# Patient Record
Sex: MALE | Race: WHITE | NOT HISPANIC OR LATINO | Employment: FULL TIME | URBAN - NONMETROPOLITAN AREA
[De-identification: names, ages, dates, MRNs, and addresses within clinical notes are randomized per-mention and may not be internally consistent; named-entity substitution may affect disease eponyms.]

---

## 2019-12-04 ENCOUNTER — OFFICE VISIT (OUTPATIENT)
Dept: INTERNAL MEDICINE | Facility: CLINIC | Age: 44
End: 2019-12-04

## 2019-12-04 VITALS
HEIGHT: 68 IN | SYSTOLIC BLOOD PRESSURE: 130 MMHG | TEMPERATURE: 97.9 F | DIASTOLIC BLOOD PRESSURE: 86 MMHG | WEIGHT: 184 LBS | OXYGEN SATURATION: 97 % | RESPIRATION RATE: 22 BRPM | BODY MASS INDEX: 27.89 KG/M2 | HEART RATE: 86 BPM

## 2019-12-04 DIAGNOSIS — K40.20 NON-RECURRENT BILATERAL INGUINAL HERNIA WITHOUT OBSTRUCTION OR GANGRENE: Primary | ICD-10-CM

## 2019-12-04 PROCEDURE — 99203 OFFICE O/P NEW LOW 30 MIN: CPT | Performed by: FAMILY MEDICINE

## 2019-12-04 NOTE — PROGRESS NOTES
Subjective     Chief Complaint   Patient presents with   • Groin Swelling     left side, painful, and causing abdominal pain with it, no urination pain or bleeding       History of Present Illness  Patient presents with complaints of left groin testicular swelling.  He notes that this occurred several weeks ago initially.  It was quite uncomfortable.  It did ultimately go away.  Since that time he has had little discomfort but over the last few days he has had the swelling again reappeared and it was very painful.  He had no difficulty with urination.  He had no difficulty with having bowel movements.  He noted no bleeding.  He has a history of previous hernia repair but it is in his abdomen and mesh was used according to him it was back in the 90s.  Currently there is no real swelling.    Patient's PMR from outside medical facility reviewed and noted.    Review of Systems     Otherwise complete ROS reviewed and negative except as mentioned in the HPI.    Past Medical History: History reviewed. No pertinent past medical history.  Past Surgical History:  Past Surgical History:   Procedure Laterality Date   • HERNIA REPAIR  1996   • SHOULDER ACROMIOCLAVICULAR JOINT REPAIR Right 2016     Social History:  reports that he has been smoking electronic cigarette.  He has never used smokeless tobacco. He reports that he drinks about 0.6 - 1.2 oz of alcohol per week. He reports that he does not use drugs.    Family History: family history includes Arthritis in his paternal grandfather; Cancer in his maternal grandfather and paternal grandfather; Diabetes in his maternal grandfather and paternal grandfather; Heart disease in his paternal grandfather.       Allergies:  No Known Allergies  Medications:  Prior to Admission medications    Not on File       Objective     Vital Signs: /86 (BP Location: Left arm, Patient Position: Sitting, Cuff Size: Adult)   Pulse 86   Temp 97.9 °F (36.6 °C) (Oral)   Resp 22   Ht  "171.5 cm (67.5\")   Wt 83.5 kg (184 lb)   SpO2 97%   BMI 28.39 kg/m²   Physical Exam   Constitutional: He is oriented to person, place, and time. He appears well-developed and well-nourished.   HENT:   Head: Normocephalic and atraumatic.   Abdominal: Soft. Bowel sounds are normal. He exhibits no distension and no mass. There is no tenderness.   Genitourinary: Penis normal.   Genitourinary Comments: The penis is circumcised.  Normal in appearance.  The testes and testicular sac are examined.  The testes are of an equal size.  The tubular structures are nontender.  The testes themselves are not nodular.  Patient is stood for an exam of the inguinal canal.  The exam of the left canal reveals  marked tapping against the finger with bearing down and cough.  Evaluation of the right inguinal canal reveals a soft tap with cough.   Musculoskeletal: Normal range of motion. He exhibits no edema or deformity.   Neurological: He is alert and oriented to person, place, and time.   Skin: Skin is warm and dry.   Psychiatric: He has a normal mood and affect. His behavior is normal.   Nursing note and vitals reviewed.      Patient's Body mass index is 28.39 kg/m². BMI is within normal parameters. No follow-up required..      Results Reviewed:  No results found for: GLUCOSE, BUN, CREATININE, NA, K, CL, CO2, CALCIUM, ALT, AST, WBC, HCT, PLT, CHOL, TRIG, HDL, LDL, LDLHDL, HGBA1C      Assessment / Plan     Assessment/Plan:  1. Non-recurrent bilateral inguinal hernia without obstruction or gangrene    - Ambulatory Referral to General Surgery  Discussed with Dr Indigo Robledo and she will see on the 6th at 1400 hours      Return if symptoms worsen or fail to improve. unless patient needs to be seen sooner or acute issues arise.        I have discussed the patient results/orders and and plan/recommendation with them at today's visit.      Kori Field DO   12/04/2019        "

## 2019-12-07 ENCOUNTER — TELEPHONE (OUTPATIENT)
Dept: INTERNAL MEDICINE | Facility: CLINIC | Age: 44
End: 2019-12-07

## 2019-12-09 ENCOUNTER — TRANSCRIBE ORDERS (OUTPATIENT)
Dept: ADMINISTRATIVE | Facility: HOSPITAL | Age: 44
End: 2019-12-09

## 2019-12-09 DIAGNOSIS — K40.20 HERNIA OF TESTICLES: Primary | ICD-10-CM

## 2019-12-12 ENCOUNTER — HOSPITAL ENCOUNTER (OUTPATIENT)
Dept: ULTRASOUND IMAGING | Facility: HOSPITAL | Age: 44
Discharge: HOME | End: 2019-12-12
Admitting: SPECIALIST

## 2019-12-12 DIAGNOSIS — K40.20 HERNIA OF TESTICLES: ICD-10-CM

## 2019-12-12 PROCEDURE — 76870 US EXAM SCROTUM: CPT

## 2022-07-13 ENCOUNTER — HOSPITAL ENCOUNTER (OUTPATIENT)
Dept: RADIOLOGY | Facility: HOSPITAL | Age: 47
Discharge: HOME | End: 2022-07-13
Attending: PHYSICIAN ASSISTANT
Payer: COMMERCIAL

## 2022-07-13 ENCOUNTER — OFFICE VISIT (OUTPATIENT)
Dept: ORTHOPEDICS | Facility: CLINIC | Age: 47
End: 2022-07-13
Payer: COMMERCIAL

## 2022-07-13 VITALS
DIASTOLIC BLOOD PRESSURE: 85 MMHG | SYSTOLIC BLOOD PRESSURE: 135 MMHG | BODY MASS INDEX: 29.43 KG/M2 | WEIGHT: 187.5 LBS | HEIGHT: 67 IN | HEART RATE: 72 BPM

## 2022-07-13 DIAGNOSIS — M25.312 INSTABILITY OF LEFT SHOULDER JOINT: Primary | ICD-10-CM

## 2022-07-13 DIAGNOSIS — G89.29 CHRONIC LEFT SHOULDER PAIN: ICD-10-CM

## 2022-07-13 DIAGNOSIS — M25.512 CHRONIC LEFT SHOULDER PAIN: ICD-10-CM

## 2022-07-13 DIAGNOSIS — M54.2 CERVICAL PAIN: ICD-10-CM

## 2022-07-13 DIAGNOSIS — M25.511 RIGHT SHOULDER PAIN, UNSPECIFIED CHRONICITY: ICD-10-CM

## 2022-07-13 PROCEDURE — 1160F PR REVIEW ALL MEDS BY PRESCRIBER/CLIN PHARMACIST DOCUMENTED: ICD-10-PCS | Mod: CPTII,S$GLB,, | Performed by: PHYSICIAN ASSISTANT

## 2022-07-13 PROCEDURE — 3008F BODY MASS INDEX DOCD: CPT | Mod: CPTII,S$GLB,, | Performed by: PHYSICIAN ASSISTANT

## 2022-07-13 PROCEDURE — 3079F DIAST BP 80-89 MM HG: CPT | Mod: CPTII,S$GLB,, | Performed by: PHYSICIAN ASSISTANT

## 2022-07-13 PROCEDURE — 99203 OFFICE O/P NEW LOW 30 MIN: CPT | Mod: S$GLB,,, | Performed by: PHYSICIAN ASSISTANT

## 2022-07-13 PROCEDURE — 73030 X-RAY EXAM OF SHOULDER: CPT | Mod: 26,LT,, | Performed by: RADIOLOGY

## 2022-07-13 PROCEDURE — 73030 XR SHOULDER COMPLETE 2 OR MORE VIEWS LEFT: ICD-10-PCS | Mod: 26,LT,, | Performed by: RADIOLOGY

## 2022-07-13 PROCEDURE — 3079F PR MOST RECENT DIASTOLIC BLOOD PRESSURE 80-89 MM HG: ICD-10-PCS | Mod: CPTII,S$GLB,, | Performed by: PHYSICIAN ASSISTANT

## 2022-07-13 PROCEDURE — 1159F PR MEDICATION LIST DOCUMENTED IN MEDICAL RECORD: ICD-10-PCS | Mod: CPTII,S$GLB,, | Performed by: PHYSICIAN ASSISTANT

## 2022-07-13 PROCEDURE — 72050 X-RAY EXAM NECK SPINE 4/5VWS: CPT | Mod: TC

## 2022-07-13 PROCEDURE — 3008F PR BODY MASS INDEX (BMI) DOCUMENTED: ICD-10-PCS | Mod: CPTII,S$GLB,, | Performed by: PHYSICIAN ASSISTANT

## 2022-07-13 PROCEDURE — 99203 PR OFFICE/OUTPT VISIT, NEW, LEVL III, 30-44 MIN: ICD-10-PCS | Mod: S$GLB,,, | Performed by: PHYSICIAN ASSISTANT

## 2022-07-13 PROCEDURE — 1160F RVW MEDS BY RX/DR IN RCRD: CPT | Mod: CPTII,S$GLB,, | Performed by: PHYSICIAN ASSISTANT

## 2022-07-13 PROCEDURE — 99999 PR PBB SHADOW E&M-NEW PATIENT-LVL IV: CPT | Mod: PBBFAC,,, | Performed by: PHYSICIAN ASSISTANT

## 2022-07-13 PROCEDURE — 1159F MED LIST DOCD IN RCRD: CPT | Mod: CPTII,S$GLB,, | Performed by: PHYSICIAN ASSISTANT

## 2022-07-13 PROCEDURE — 3075F SYST BP GE 130 - 139MM HG: CPT | Mod: CPTII,S$GLB,, | Performed by: PHYSICIAN ASSISTANT

## 2022-07-13 PROCEDURE — 3075F PR MOST RECENT SYSTOLIC BLOOD PRESS GE 130-139MM HG: ICD-10-PCS | Mod: CPTII,S$GLB,, | Performed by: PHYSICIAN ASSISTANT

## 2022-07-13 PROCEDURE — 99999 PR PBB SHADOW E&M-NEW PATIENT-LVL IV: ICD-10-PCS | Mod: PBBFAC,,, | Performed by: PHYSICIAN ASSISTANT

## 2022-07-13 PROCEDURE — 72050 XR CERVICAL SPINE AP LAT WITH FLEX EXTEN: ICD-10-PCS | Mod: 26,,, | Performed by: RADIOLOGY

## 2022-07-13 PROCEDURE — 72050 X-RAY EXAM NECK SPINE 4/5VWS: CPT | Mod: 26,,, | Performed by: RADIOLOGY

## 2022-07-13 PROCEDURE — 73030 X-RAY EXAM OF SHOULDER: CPT | Mod: TC,LT

## 2022-07-13 RX ORDER — MELOXICAM 15 MG/1
15 TABLET ORAL DAILY
Qty: 30 TABLET | Refills: 1 | Status: SHIPPED | OUTPATIENT
Start: 2022-07-13 | End: 2022-08-12

## 2022-07-13 RX ORDER — MELOXICAM 15 MG/1
15 TABLET ORAL DAILY
Qty: 30 TABLET | Refills: 1 | Status: SHIPPED | OUTPATIENT
Start: 2022-07-13 | End: 2022-07-13

## 2022-07-13 NOTE — PROGRESS NOTES
SUBJECTIVE:     Chief Complaint & History of Present Illness:  Cruz Thomas Jr. is a  New  patient 47 y.o. male who is seen here today with a complaint of    Chief Complaint   Patient presents with    Left Shoulder - Pain    Neck - Pain    .  Patient is here today for evaluation treatment of left shoulder and to a lesser degree neck pain for the past several months.  It has had off and on problems with both shoulders and states he has actually had right shoulder rotator cuff issues dating back several years.  He works as a heavy  and does significant amounts of heavy lifting and caring on a daily basis.  Although he is not remember a specific trauma or injury to the shoulder he has had increasing difficulty with activities at shoulder height or greater with pain radiating up towards the trapezius muscle in the neck.  Has taken over-the-counter and prescription NSAIDs with little relief he has not noticed any loss in range of motion but has felt he has some decreased strength particularly with movements away from the core  On a scale of 1-10, with 10 being worst pain imaginable, he rates this pain as 2 on good days and 8 on bad days.  he describes the pain as sore and aching.    Review of patient's allergies indicates:  No Known Allergies      Current Outpatient Medications   Medication Sig Dispense Refill    meloxicam (MOBIC) 15 MG tablet Take 1 tablet (15 mg total) by mouth once daily. 30 tablet 1     No current facility-administered medications for this visit.       Past Medical History:   Diagnosis Date    Back pain        Past Surgical History:   Procedure Laterality Date    HERNIA REPAIR      SHOULDER SURGERY         Vital Signs (Most Recent)  Vitals:    07/13/22 0724   BP: 135/85   Pulse: 72       Review of Systems:  ROS:  Constitutional: no fever or chills  Eyes: no visual changes  ENT: no nasal congestion or sore throat  Respiratory: no cough or shortness of  "breath  Cardiovascular: no chest pain or palpitations  Gastrointestinal: no nausea or vomiting, tolerating diet  Genitourinary: no hematuria or dysuria  Integument/Breast: no rash or pruritis  Hematologic/Lymphatic: no easy bruising or lymphadenopathy  Musculoskeletal: no arthralgias or myalgias  Neurological: no seizures or tremors  Behavioral/Psych: no auditory or visual hallucinations  Endocrine: no heat or cold intolerance      OBJECTIVE:     PHYSICAL EXAM:  Height: 5' 7" (170.2 cm) Weight: 85.1 kg (187 lb 8 oz), General Appearance: Well nourished, well developed, in no acute distress.  Neurological: Mood & affect are normal.  Shoulder exam: left  Tenderness: AC joint, biceps tendon, lateral acromial, trapezius muscle  ROM: forward flexion 180/180, extension 45/45, full abduction 180/180, abduction-glenohumeral 90/90, external rotation 50/50, pain at the extremes of mobility  Shoulder Strength: biceps 5/5, triceps 5/5, abduction 5/5, adduction 5/5, external rotation 5/5 with shoulder at side, flexion 5/5, and extension 5/5  positive for tenderness about the glenohumeral joint, positive for tenderness over the acromioclavicular joint and negative for impingement sign  Stability tests: anterior apprehension test negative and posterior apprehension test positive for pain only  Special Tests:Cross-chest abduction: diffuse pain and [place]'s test: pain greater with pronation                     RADIOGRAPHS:  X-rays shoulder taken today films reviewed by me demonstrate no evidence of fracture dislocation mild glenohumeral joint space narrowing no osteophytic spurring sclerotic changes    X-rays of the cervical spine taken today films reviewed me demonstrate mild disc space narrowing and early endplate spurring with mild to moderate arthritic changes no evidence of significant foraminal impingement    ASSESSMENT/PLAN:       ICD-10-CM ICD-9-CM   1. Instability of left shoulder joint  M25.312 718.81   2. Right shoulder " pain, unspecified chronicity  M25.511 719.41   3. Cervical pain  M54.2 723.1   4. Chronic left shoulder pain  M25.512 719.41    G89.29 338.29       Plan: We discussed with the patient at length all the different treatment options available for his left shoulder including anti-inflammatories, acetaminophen, rest, ice, Physical therapy to include strengthening exercise, occasional cortisone injections for temporary relief, arthroscopic surgical repair, and finally shoulder arthroplasty.   The patient's instability and loss of strength will move foward MRI left shoulder will contact the patient following MRI to discuss treatment options.  Meloxicam 15 mg q.d. with food times 10 days by p.r.n.

## 2022-07-22 ENCOUNTER — PATIENT MESSAGE (OUTPATIENT)
Dept: ORTHOPEDICS | Facility: CLINIC | Age: 47
End: 2022-07-22
Payer: COMMERCIAL

## 2025-02-27 NOTE — PROGRESS NOTES
"CC: Left shoulder pain    49 y.o. Male presents as a new patient to me. Patient is right hand dominant. He works as an . Complaint is left shoulder pain that began approximately 2 weeks ago with no inciting event.  Pain localizes throughout anterior and lateral aspect of shoulder. Worse with internal rotation, reaching, driving, and overhead movements. Pain is disruptive to sleep at night. Better with rest. He reports history of neck pain and a "pinched" nerve. He describes radicular symptoms throughout left upper extremity and median nerve distribution.  He does feel that he has carpal tunnel syndrome as well.  Treatment thus far has included activity modifications and rest.  Here today to discuss diagnosis and treatment options.     It sounds like he had a similar such episode in the past.  MRI of the left shoulder was done which was negative.  He does have a history of prior right shoulder surgery for labral pathology years ago.    PMHx notable for n/a.   Positive for tobacco.   Negative for diabetes. Last A1C: 4.3 02/12/15    PAST MEDICAL HISTORY:   Past Medical History:   Diagnosis Date    Back pain      PAST SURGICAL HISTORY:  Past Surgical History:   Procedure Laterality Date    HERNIA REPAIR      SHOULDER SURGERY       FAMILY HISTORY:  Family History   Problem Relation Name Age of Onset    Anesthesia problems Neg Hx      Broken bones Neg Hx      Cancer Neg Hx      Clotting disorder Neg Hx      Collagen disease Neg Hx      Diabetes Neg Hx      Dislocations Neg Hx      Osteoporosis Neg Hx      Rheumatologic disease Neg Hx      Scoliosis Neg Hx      Severe sprains Neg Hx       MEDICATIONS:  Current Medications[1]    ALLERGIES:  Review of patient's allergies indicates:  No Known Allergies     REVIEW OF SYSTEMS:  Constitution: Negative. Negative for chills, fever and night sweats.    Hematologic/Lymphatic: Negative for bleeding problem. Does not bruise/bleed easily.   Skin: Negative for dry skin, " itching and rash.   Musculoskeletal: Negative for falls. Positive for left shoulder pain and muscle weakness.     All other review of symptoms were reviewed and found to be noncontributory.    PHYSICAL EXAMINATION:  Vitals:  Wt 84.6 kg (186 lb 8.2 oz)   BMI 29.21 kg/m²    General: Well-developed well-nourished 49 y.o. malein no acute distress   Cardiovascular: Regular rhythm by palpation of distal pulse, normal color and temperature, no concerning varicosities on symptomatic side   Lungs: No labored breathing or wheezing appreciated   Neuro: Alert and oriented ×3   Psychiatric: well oriented to person, place and time, demonstrates normal mood and affect   Skin: No rashes, lesions or ulcers, normal temperature, turgor, and texture on uninvolved extremity    Ortho/SPM Exam  Examination of the left shoulder demonstrates active forward elevation to 160, ER with arm at side to 60, IR to L10. Passive FE to 170, ER to 70.  No pain specifically over the posterior glenohumeral joint line.  Some tenderness and pain to palpation over the proximal biceps groove.  Positive modified speed's test.  Negative AC joint.  Nontender over Codman's point.  5/5 resisted scaption.  No pain.  5/5 resisted external rotation with arm at side.  Intact belly press test.  Stable shoulder.  Reduced cervical neck range of motion.  Equivocal Spurling's maneuver to the right more so than left.  Motor and sensory intact to the left hand otherwise.    Negative Tinel sign over the left wrist.  Negative Durkan's maneuver.    IMAGING:  Xrays including AP, Outlet and Axillary Lateral of Left shoulder are reviewed by me:   No significant degenerative changes    MRI 07/19/22 of left shoulder reviewed by me and discussed with patient. Study shows:   Subchondral cystic change superior glenoid with overlying minimal cartilaginous irregularity.  Suspect posterior labral tear with small paralabral cyst.    ASSESSMENT:      ICD-10-CM ICD-9-CM   1. Biceps  tendinitis of left upper extremity  M75.22 726.12   2. Acute pain of left shoulder  M25.512 719.41   3. Left cervical radiculopathy  M54.12 723.4   4. Left carpal tunnel syndrome  G56.02 354.0       PLAN:     Findings discussed with the patient in his wife.  He has pain over the anterior aspect of the shoulder and I think there was a cervical radicular component as well.  We discussed treatment options.  Discussed the potential benefit of a ultrasound-guided biceps sheath steroid injection.  He wishes to hold off on that.  I do think there is a strong cervicogenic component to his current pain as well.  Prescriptions given for Medrol Dosepak and Celebrex.  Safe use instructions provided.  He wishes to hold off on formal PT. it does sound like he has carpal tunnel syndrome symptoms by report.  Negative exam today for diagnostic findings.  He had nerve conduction study ordered given the chronicity of the symptoms.  Discussed ordering nighttime wrist splints.  Return to clinic as needed    Procedures         [1] No current outpatient medications on file.

## 2025-03-03 ENCOUNTER — OFFICE VISIT (OUTPATIENT)
Dept: SPORTS MEDICINE | Facility: CLINIC | Age: 50
End: 2025-03-03
Payer: COMMERCIAL

## 2025-03-03 ENCOUNTER — HOSPITAL ENCOUNTER (OUTPATIENT)
Dept: RADIOLOGY | Facility: HOSPITAL | Age: 50
Discharge: HOME | End: 2025-03-03
Attending: ORTHOPAEDIC SURGERY
Payer: COMMERCIAL

## 2025-03-03 VITALS — WEIGHT: 186.5 LBS | BODY MASS INDEX: 29.21 KG/M2

## 2025-03-03 DIAGNOSIS — M25.512 LEFT SHOULDER PAIN, UNSPECIFIED CHRONICITY: ICD-10-CM

## 2025-03-03 DIAGNOSIS — M75.22 BICEPS TENDINITIS OF LEFT UPPER EXTREMITY: Primary | ICD-10-CM

## 2025-03-03 DIAGNOSIS — M25.512 ACUTE PAIN OF LEFT SHOULDER: ICD-10-CM

## 2025-03-03 DIAGNOSIS — G56.02 LEFT CARPAL TUNNEL SYNDROME: ICD-10-CM

## 2025-03-03 DIAGNOSIS — M54.12 LEFT CERVICAL RADICULOPATHY: ICD-10-CM

## 2025-03-03 PROCEDURE — 73030 X-RAY EXAM OF SHOULDER: CPT | Mod: TC,LT

## 2025-03-03 PROCEDURE — 99999 PR PBB SHADOW E&M-EST. PATIENT-LVL III: CPT | Mod: PBBFAC,,, | Performed by: ORTHOPAEDIC SURGERY

## 2025-03-03 PROCEDURE — 99204 OFFICE O/P NEW MOD 45 MIN: CPT | Mod: S$GLB,,, | Performed by: ORTHOPAEDIC SURGERY

## 2025-03-03 PROCEDURE — 73030 X-RAY EXAM OF SHOULDER: CPT | Mod: 26,LT,, | Performed by: RADIOLOGY

## 2025-03-03 PROCEDURE — 3008F BODY MASS INDEX DOCD: CPT | Mod: CPTII,S$GLB,, | Performed by: ORTHOPAEDIC SURGERY

## 2025-03-03 PROCEDURE — 1159F MED LIST DOCD IN RCRD: CPT | Mod: CPTII,S$GLB,, | Performed by: ORTHOPAEDIC SURGERY

## 2025-03-03 RX ORDER — METHYLPREDNISOLONE 4 MG/1
TABLET ORAL
Qty: 21 EACH | Refills: 0 | Status: SHIPPED | OUTPATIENT
Start: 2025-03-03

## 2025-03-03 RX ORDER — CELECOXIB 200 MG/1
200 CAPSULE ORAL DAILY
Qty: 30 CAPSULE | Refills: 1 | Status: SHIPPED | OUTPATIENT
Start: 2025-03-03

## 2025-08-04 ENCOUNTER — TELEPHONE (OUTPATIENT)
Dept: OPHTHALMOLOGY | Facility: CLINIC | Age: 50
End: 2025-08-04
Payer: COMMERCIAL

## 2025-08-04 ENCOUNTER — PATIENT MESSAGE (OUTPATIENT)
Dept: OPTOMETRY | Facility: CLINIC | Age: 50
End: 2025-08-04
Payer: COMMERCIAL

## 2025-08-04 NOTE — TELEPHONE ENCOUNTER
Spoke to Pt's wife and found out that Pt has an appointment w/ Dr. Lopez coming up. Advised to keep that appointment and see what Dr. Lopez has to say before seeing Dr. Childress

## 2025-08-04 NOTE — TELEPHONE ENCOUNTER
----- Message from David Tirado sent at 8/4/2025 10:00 AM CDT -----  Pt seen in ED has an urgent retina referral

## 2025-08-07 ENCOUNTER — OFFICE VISIT (OUTPATIENT)
Dept: OPTOMETRY | Facility: CLINIC | Age: 50
End: 2025-08-07
Payer: COMMERCIAL

## 2025-08-07 DIAGNOSIS — H40.013 OPEN ANGLE WITH BORDERLINE FINDINGS OF BOTH EYES: ICD-10-CM

## 2025-08-07 DIAGNOSIS — H53.9 VISUAL CHANGES: Primary | ICD-10-CM

## 2025-08-07 PROCEDURE — 1159F MED LIST DOCD IN RCRD: CPT | Mod: CPTII,S$GLB,, | Performed by: OPTOMETRIST

## 2025-08-07 PROCEDURE — 1160F RVW MEDS BY RX/DR IN RCRD: CPT | Mod: CPTII,S$GLB,, | Performed by: OPTOMETRIST

## 2025-08-07 PROCEDURE — 99204 OFFICE O/P NEW MOD 45 MIN: CPT | Mod: S$GLB,,, | Performed by: OPTOMETRIST

## 2025-08-07 PROCEDURE — 99999 PR PBB SHADOW E&M-EST. PATIENT-LVL II: CPT | Mod: PBBFAC,,, | Performed by: OPTOMETRIST

## 2025-08-07 NOTE — PROGRESS NOTES
HPI    YODIT: 1 year ago   Last DFE: 1 year ago   Chief complaint (CC):  49 yo M here for annual exam. Pt reports on 7/31,   he lost vision OD for a few min while driving. Vision came back slowly but   then went back out after 10 mins for another few min. Vision went   completely black during this episode. Patient states the episode was   followed by a bad headache for 4 days, after which his significant other   made him go to the ED. All results came back normal but they advised he   get a DFE. No previous eye injuries or surgeries or conditions.  (-) HTN or HLD     Patient reports longstanding h/o shapes in vision- different colors,   lightening bolts- however never followed by migraine headache.       Glasses? +  Contacts? -  H/o eye surgery, injections or laser: -  H/o eye injury: -  Known eye conditions? -  Family h/o eye conditions? -  Eye gtts? -      (-) Flashes (-)  Floaters (+) Mucous   (-)  Tearing (-) Itching (-) Burning   (+) Headaches (-) Eye Pain/discomfort (-) Irritation   (-)  Redness (-) Double vision (-) Blurry vision    CL Exam: No    Diabetic? No  A1c? Lab Results       Component                Value               Date                       HGBA1C                   4.3 (L)             02/12/2015                  Last edited by Frida Lopez, OD on 8/7/2025  1:08 PM.            Assessment /Plan     For exam results, see Encounter Report.        Visual changes  - Subjective visual disturbance followed by 4 day headache  - Likely h/o of ocular migraines  - CT Head without contrast 8/3/25: Impression:  No CT evidence of acute intracranial abnormality. If the patient has an acute, focal neurological deficit, MRI of the brain may be indicated.  - CTA Head and Neck 8/7/25: Impression:   No acute intracranial process.  Punctate fat density suprasellar cistern presumably reflects a tiny lipoma.  No significant stenosis at the carotid bifurcations by NASCET criteria.  No significant stenosis involving  the vertebral arteries.  No evidence of dissection.  No major branch advanced stenosis/occlusion at the [id number].  The ophthalmic arteries are patent at least proximally.  - Pt advised to f/u with neurology     Open angle with borderline findings of both eyes   - Secondary to elevated IOP OU and ONH appearance   - IOP elevated OU (25/25)   - OCT RNFL today (8/7/25)      - RTC for baseline VF 24-2 SF, IOP check and Pachys.

## 2025-08-22 ENCOUNTER — OFFICE VISIT (OUTPATIENT)
Facility: CLINIC | Age: 50
End: 2025-08-22
Payer: COMMERCIAL

## 2025-08-22 DIAGNOSIS — G43.101 MIGRAINE WITH AURA AND WITH STATUS MIGRAINOSUS, NOT INTRACTABLE: ICD-10-CM

## 2025-08-22 DIAGNOSIS — H54.7 UNSPECIFIED VISUAL LOSS: Primary | ICD-10-CM

## 2025-08-22 RX ORDER — SUMATRIPTAN SUCCINATE 50 MG/1
50 TABLET ORAL
Qty: 15 TABLET | Refills: 11 | Status: SHIPPED | OUTPATIENT
Start: 2025-08-22

## 2025-08-29 ENCOUNTER — HOSPITAL ENCOUNTER (OUTPATIENT)
Dept: RADIOLOGY | Facility: HOSPITAL | Age: 50
Discharge: HOME | End: 2025-08-29
Attending: PHYSICIAN ASSISTANT
Payer: COMMERCIAL